# Patient Record
Sex: FEMALE | Race: WHITE | Employment: FULL TIME | URBAN - METROPOLITAN AREA
[De-identification: names, ages, dates, MRNs, and addresses within clinical notes are randomized per-mention and may not be internally consistent; named-entity substitution may affect disease eponyms.]

---

## 2023-05-26 ENCOUNTER — OFFICE VISIT (OUTPATIENT)
Dept: INTERNAL MEDICINE CLINIC | Facility: CLINIC | Age: 53
End: 2023-05-26
Payer: COMMERCIAL

## 2023-05-26 VITALS
DIASTOLIC BLOOD PRESSURE: 60 MMHG | HEIGHT: 62 IN | SYSTOLIC BLOOD PRESSURE: 110 MMHG | BODY MASS INDEX: 19.69 KG/M2 | WEIGHT: 107 LBS

## 2023-05-26 DIAGNOSIS — Z00.00 ENCOUNTER FOR MEDICAL EXAMINATION TO ESTABLISH CARE: Primary | ICD-10-CM

## 2023-05-26 DIAGNOSIS — I83.90 VARICOSE VEIN: ICD-10-CM

## 2023-05-26 RX ORDER — CALCIUM CARBONATE/VITAMIN D3 600 MG-10
1 TABLET ORAL DAILY
COMMUNITY

## 2024-07-16 ENCOUNTER — OFFICE VISIT (OUTPATIENT)
Dept: INTERNAL MEDICINE CLINIC | Facility: CLINIC | Age: 54
End: 2024-07-16
Payer: COMMERCIAL

## 2024-07-16 VITALS
DIASTOLIC BLOOD PRESSURE: 70 MMHG | HEART RATE: 66 BPM | OXYGEN SATURATION: 96 % | HEIGHT: 62 IN | WEIGHT: 106 LBS | BODY MASS INDEX: 19.51 KG/M2 | SYSTOLIC BLOOD PRESSURE: 120 MMHG

## 2024-07-16 DIAGNOSIS — Z12.31 SCREENING MAMMOGRAM, ENCOUNTER FOR: ICD-10-CM

## 2024-07-16 DIAGNOSIS — Z00.00 WELLNESS EXAMINATION: Primary | ICD-10-CM

## 2024-07-16 PROCEDURE — 99396 PREV VISIT EST AGE 40-64: CPT | Performed by: INTERNAL MEDICINE

## 2024-07-16 NOTE — PROGRESS NOTES
Subjective:   Naomi Pfeiffer is a 53 year old female who presents for Physical     Patient here for a wellness examination.  Has no medical hx except for IBS- diet controlled.  History/Other:  Is post menopausal - no Hrt. Needs mammogram order.   Chief Complaint Reviewed and Verified  No Further Nursing Notes to   Review  Medications Reviewed         Tobacco:  Non smoker      Current Outpatient Medications   Medication Sig Dispense Refill    ascorbic acid 1000 MG Oral Tab Take 1 tablet (1,000 mg total) by mouth daily.      Calcium Carb-Cholecalciferol 600-10 MG-MCG Oral Tab Take 1 tablet by mouth daily.           Review of Systems:  Review of Systems   Constitutional: Negative.    HENT: Negative.     Respiratory: Negative.     Cardiovascular: Negative.    Gastrointestinal: Negative.    Endocrine: Negative.    Genitourinary: Negative.    Musculoskeletal: Negative.    Neurological: Negative.    Psychiatric/Behavioral: Negative.           Objective:   /70   Pulse 66   Ht 5' 2\" (1.575 m)   Wt 106 lb (48.1 kg)   SpO2 96%   BMI 19.39 kg/m²  Estimated body mass index is 19.39 kg/m² as calculated from the following:    Height as of this encounter: 5' 2\" (1.575 m).    Weight as of this encounter: 106 lb (48.1 kg).  Physical Exam  Constitutional:       Appearance: She is normal weight.   HENT:      Head: Normocephalic and atraumatic.      Right Ear: Ear canal normal.      Left Ear: Ear canal normal.   Eyes:      General: No scleral icterus.     Pupils: Pupils are equal, round, and reactive to light.   Cardiovascular:      Rate and Rhythm: Normal rate and regular rhythm.   Pulmonary:      Effort: Pulmonary effort is normal.      Breath sounds: Normal breath sounds.   Abdominal:      Palpations: Abdomen is soft.      Tenderness: There is no abdominal tenderness.   Genitourinary:     General: Normal vulva.      Comments: Small endocervical polyp no dc Pap taken  Musculoskeletal:      Cervical back: Neck supple.    Lymphadenopathy:      Cervical: No cervical adenopathy.   Skin:     Findings: No lesion.   Neurological:      Mental Status: She is alert. Mental status is at baseline.   Psychiatric:         Thought Content: Thought content normal.           Assessment & Plan:   1. Wellness examination (Primary) check fasting  labs  2. Screening mammogram - referred      No follow-ups on file.    Ambar Novak MD, 7/16/2024, 11:13 AM

## 2024-07-18 LAB
ABSOLUTE BASOPHILS: 51 CELLS/UL (ref 0–200)
ABSOLUTE EOSINOPHILS: 147 CELLS/UL (ref 15–500)
ABSOLUTE LYMPHOCYTES: 2157 CELLS/UL (ref 850–3900)
ABSOLUTE MONOCYTES: 368 CELLS/UL (ref 200–950)
ABSOLUTE NEUTROPHILS: 1877 CELLS/UL (ref 1500–7800)
ALBUMIN/GLOBULIN RATIO: 2.1 (CALC) (ref 1–2.5)
ALBUMIN: 4.5 G/DL (ref 3.6–5.1)
ALKALINE PHOSPHATASE: 63 U/L (ref 37–153)
ALT: 15 U/L (ref 6–29)
AST: 25 U/L (ref 10–35)
BASOPHILS: 1.1 %
BILIRUBIN, TOTAL: 0.3 MG/DL (ref 0.2–1.2)
BUN: 9 MG/DL (ref 7–25)
CALCIUM: 9.8 MG/DL (ref 8.6–10.4)
CARBON DIOXIDE: 29 MMOL/L (ref 20–32)
CHLORIDE: 102 MMOL/L (ref 98–110)
CHOL/HDLC RATIO: 2.1 (CALC)
CHOLESTEROL, TOTAL: 224 MG/DL
CREATININE: 0.67 MG/DL (ref 0.5–1.03)
EGFR: 104 ML/MIN/1.73M2
EOSINOPHILS: 3.2 %
GLOBULIN: 2.1 G/DL (CALC) (ref 1.9–3.7)
GLUCOSE: 91 MG/DL (ref 65–99)
HDL CHOLESTEROL: 106 MG/DL
HEMATOCRIT: 40 % (ref 35–45)
HEMOGLOBIN: 12.3 G/DL (ref 11.7–15.5)
LDL-CHOLESTEROL: 101 MG/DL (CALC)
LYMPHOCYTES: 46.9 %
MCH: 30.5 PG (ref 27–33)
MCHC: 30.8 G/DL (ref 32–36)
MCV: 99.3 FL (ref 80–100)
MONOCYTES: 8 %
MPV: 13.9 FL (ref 7.5–12.5)
NEUTROPHILS: 40.8 %
NON-HDL CHOLESTEROL: 118 MG/DL (CALC)
PLATELET COUNT: 234 THOUSAND/UL (ref 140–400)
POTASSIUM: 4.5 MMOL/L (ref 3.5–5.3)
PROTEIN, TOTAL: 6.6 G/DL (ref 6.1–8.1)
RDW: 12.2 % (ref 11–15)
RED BLOOD CELL COUNT: 4.03 MILLION/UL (ref 3.8–5.1)
SODIUM: 139 MMOL/L (ref 135–146)
TRIGLYCERIDES: 77 MG/DL
WHITE BLOOD CELL COUNT: 4.6 THOUSAND/UL (ref 3.8–10.8)

## 2024-08-01 ENCOUNTER — HOSPITAL ENCOUNTER (OUTPATIENT)
Dept: MAMMOGRAPHY | Age: 54
Discharge: HOME | End: 2024-08-01
Attending: INTERNAL MEDICINE
Payer: COMMERCIAL

## 2024-08-01 DIAGNOSIS — Z12.31 SCREENING MAMMOGRAM, ENCOUNTER FOR: ICD-10-CM

## 2024-08-01 PROCEDURE — 77063 BREAST TOMOSYNTHESIS BI: CPT | Performed by: INTERNAL MEDICINE

## 2024-08-01 PROCEDURE — 77067 SCR MAMMO BI INCL CAD: CPT | Performed by: INTERNAL MEDICINE

## 2025-07-21 ENCOUNTER — OFFICE VISIT (OUTPATIENT)
Dept: INTERNAL MEDICINE CLINIC | Facility: CLINIC | Age: 55
End: 2025-07-21
Payer: COMMERCIAL

## 2025-07-21 VITALS
SYSTOLIC BLOOD PRESSURE: 100 MMHG | OXYGEN SATURATION: 94 % | WEIGHT: 102.81 LBS | HEIGHT: 62 IN | HEART RATE: 68 BPM | BODY MASS INDEX: 18.92 KG/M2 | DIASTOLIC BLOOD PRESSURE: 62 MMHG

## 2025-07-21 DIAGNOSIS — K58.8 OTHER IRRITABLE BOWEL SYNDROME: ICD-10-CM

## 2025-07-21 DIAGNOSIS — Z12.31 SCREENING MAMMOGRAM, ENCOUNTER FOR: ICD-10-CM

## 2025-07-21 DIAGNOSIS — Z00.00 WELLNESS EXAMINATION: Primary | ICD-10-CM

## 2025-07-21 PROCEDURE — 99396 PREV VISIT EST AGE 40-64: CPT | Performed by: INTERNAL MEDICINE

## 2025-07-21 NOTE — PROGRESS NOTES
Subjective:   Naomi Pfeiffer is a 54 year old female who presents for Physical (Annual )     Patient here for a wellness examination and fu on some recent bowel issues which were triggered by some packaged rice products cooked out of a plastic bag. Is concerned about having too much arsenic in the diet. Has known hx of IBS.  Had colonoscopy less than 10 years ago.  History/Other:    Chief Complaint Reviewed and Verified  Nursing Notes Reviewed and   Verified  Tobacco Reviewed  Allergies Reviewed  Medications Reviewed    OB Status Reviewed         Tobacco: non smoker      Current Medications[1]      Review of Systems:  Review of Systems   Respiratory: Negative.     Gastrointestinal:  Negative for abdominal distention, abdominal pain, anal bleeding and constipation.   Genitourinary: Negative.    Neurological: Negative.    Psychiatric/Behavioral: Negative.           Objective:   /62   Pulse 68   Ht 5' 2\" (1.575 m)   Wt 102 lb 12.8 oz (46.6 kg)   SpO2 94%   BMI 18.80 kg/m²  Estimated body mass index is 18.8 kg/m² as calculated from the following:    Height as of this encounter: 5' 2\" (1.575 m).    Weight as of this encounter: 102 lb 12.8 oz (46.6 kg).  Physical Exam  Constitutional:       Appearance: She is normal weight.   HENT:      Head: Normocephalic and atraumatic.      Right Ear: Ear canal normal.      Left Ear: Ear canal normal.   Eyes:      General: No scleral icterus.     Pupils: Pupils are equal, round, and reactive to light.   Cardiovascular:      Rate and Rhythm: Normal rate and regular rhythm.   Pulmonary:      Effort: Pulmonary effort is normal.      Breath sounds: Normal breath sounds.   Abdominal:      General: Abdomen is flat.      Palpations: There is no mass.      Hernia: No hernia is present.      Comments: Scaphoi no masses   Genitourinary:     Comments: Breasts no palpable masses dense architecture  no nipple anomaly  Musculoskeletal:      Cervical back: Neck supple.      Right  lower leg: No edema.      Left lower leg: No edema.   Lymphadenopathy:      Cervical: No cervical adenopathy.   Skin:     Findings: No lesion.   Neurological:      General: No focal deficit present.      Mental Status: She is alert.   Psychiatric:         Thought Content: Thought content normal.           Assessment & Plan:   1. Wellness examination (Primary) check fasting labs  2. Screening mammogram, encounter for - referred  3. Other irritable bowel syndrome - high fiber diet and fluid intake        No follow-ups on file.    Ambar Novak MD, 7/21/2025, 2:10 PM        [1]   Current Outpatient Medications   Medication Sig Dispense Refill    Multiple Vitamins-Minerals (MULTI VITAMIN/MINERALS) Oral Tab Take by mouth.      ascorbic acid 1000 MG Oral Tab Take 1 tablet (1,000 mg total) by mouth daily.      Calcium Carb-Cholecalciferol 600-10 MG-MCG Oral Tab Take 1 tablet by mouth daily.

## 2025-07-27 LAB
ABSOLUTE BASOPHILS: 40 CELLS/UL (ref 0–200)
ABSOLUTE EOSINOPHILS: 189 CELLS/UL (ref 15–500)
ABSOLUTE LYMPHOCYTES: 1430 CELLS/UL (ref 850–3900)
ABSOLUTE MONOCYTES: 238 CELLS/UL (ref 200–950)
ABSOLUTE NEUTROPHILS: 2504 CELLS/UL (ref 1500–7800)
ALBUMIN/GLOBULIN RATIO: 2.2 (CALC) (ref 1–2.5)
ALBUMIN: 4.3 G/DL (ref 3.6–5.1)
ALKALINE PHOSPHATASE: 75 U/L (ref 37–153)
ALT: 17 U/L (ref 6–29)
AST: 22 U/L (ref 10–35)
BASOPHILS: 0.9 %
BILIRUBIN, TOTAL: 0.3 MG/DL (ref 0.2–1.2)
BUN: 8 MG/DL (ref 7–25)
CALCIUM: 9.5 MG/DL (ref 8.6–10.4)
CARBON DIOXIDE: 31 MMOL/L (ref 20–32)
CHLORIDE: 103 MMOL/L (ref 98–110)
CHOL/HDLC RATIO: 2.4 (CALC)
CHOLESTEROL, TOTAL: 213 MG/DL
CREATININE: 0.66 MG/DL (ref 0.5–1.03)
EGFR: 103 ML/MIN/1.73M2
EOSINOPHILS: 4.3 %
GLOBULIN: 2 G/DL (CALC) (ref 1.9–3.7)
GLUCOSE: 90 MG/DL (ref 65–99)
HDL CHOLESTEROL: 90 MG/DL
HEMATOCRIT: 37 % (ref 35–45)
HEMOGLOBIN: 11.8 G/DL (ref 11.7–15.5)
LDL-CHOLESTEROL: 109 MG/DL (CALC)
LYMPHOCYTES: 32.5 %
MCH: 31.2 PG (ref 27–33)
MCHC: 31.9 G/DL (ref 32–36)
MCV: 97.9 FL (ref 80–100)
MONOCYTES: 5.4 %
MPV: 13 FL (ref 7.5–12.5)
NEUTROPHILS: 56.9 %
NON-HDL CHOLESTEROL: 123 MG/DL (CALC)
PLATELET COUNT: 260 THOUSAND/UL (ref 140–400)
POTASSIUM: 4.4 MMOL/L (ref 3.5–5.3)
PROTEIN, TOTAL: 6.3 G/DL (ref 6.1–8.1)
RDW: 11.8 % (ref 11–15)
RED BLOOD CELL COUNT: 3.78 MILLION/UL (ref 3.8–5.1)
SODIUM: 140 MMOL/L (ref 135–146)
TRIGLYCERIDES: 54 MG/DL
TSH W/REFLEX TO FT4: 1.48 MIU/L (ref 0.4–4.5)
WHITE BLOOD CELL COUNT: 4.4 THOUSAND/UL (ref 3.8–10.8)

## 2025-08-12 ENCOUNTER — HOSPITAL ENCOUNTER (OUTPATIENT)
Dept: MAMMOGRAPHY | Age: 55
Discharge: HOME | End: 2025-08-12
Attending: INTERNAL MEDICINE

## 2025-08-12 DIAGNOSIS — Z12.31 SCREENING MAMMOGRAM, ENCOUNTER FOR: ICD-10-CM

## 2025-08-12 PROCEDURE — 77067 SCR MAMMO BI INCL CAD: CPT | Performed by: INTERNAL MEDICINE

## 2025-08-12 PROCEDURE — 77063 BREAST TOMOSYNTHESIS BI: CPT | Performed by: INTERNAL MEDICINE
